# Patient Record
Sex: MALE | Race: BLACK OR AFRICAN AMERICAN | Employment: FULL TIME | URBAN - METROPOLITAN AREA
[De-identification: names, ages, dates, MRNs, and addresses within clinical notes are randomized per-mention and may not be internally consistent; named-entity substitution may affect disease eponyms.]

---

## 2018-11-15 ENCOUNTER — HOSPITAL ENCOUNTER (EMERGENCY)
Age: 63
Discharge: HOME | End: 2018-11-15
Attending: EMERGENCY MEDICINE
Payer: SELF-PAY

## 2018-11-15 VITALS
BODY MASS INDEX: 28.06 KG/M2 | TEMPERATURE: 98.5 F | RESPIRATION RATE: 18 BRPM | SYSTOLIC BLOOD PRESSURE: 145 MMHG | OXYGEN SATURATION: 97 % | DIASTOLIC BLOOD PRESSURE: 77 MMHG | HEIGHT: 65 IN | WEIGHT: 168.43 LBS | HEART RATE: 82 BPM

## 2018-11-15 DIAGNOSIS — Z76.0 MEDICATION REFILL: Primary | ICD-10-CM

## 2018-11-15 PROCEDURE — 99282 EMERGENCY DEPT VISIT SF MDM: CPT

## 2018-11-15 NOTE — ED TRIAGE NOTES
Patient arrives with medications in hand. Patient concerned that his DM and HTN medication say discard in October.  Patient wishes to get a PCP referral.

## 2018-11-15 NOTE — ED PROVIDER NOTES
70 y.o. male with past medical history significant for DM and HTN presents requesting management of his diabetes medication \"because my old doctor wont take my insurance. \"  The pt has no physical complaints at this time. He brought his metformin with him which expires this month. Explained to the patient that his medication is not  and counseled him on taking his diabetes medications as prescribed. Pt referred to [facility]. Pt also given strict return precautions. There are no other acute medical complaints at this time. PCP: Other, MD Jacki Alvarez PA-C Past Medical History:  
Diagnosis Date  Diabetes ([facility] [address]  Hypertension History reviewed. No pertinent surgical history. History reviewed. No pertinent family history. Social History Socioeconomic History  Marital status:  Spouse name: Not on file  Number of children: Not on file  Years of education: Not on file  Highest education level: Not on file Social Needs  Financial resource strain: Not on file  Food insecurity - worry: Not on file  Food insecurity - inability: Not on file  Transportation needs - medical: Not on file  Transportation needs - non-medical: Not on file Occupational History  Not on file Tobacco Use  Smoking status: Never Smoker  Smokeless tobacco: Never Used Substance and Sexual Activity  Alcohol use: No  
  Frequency: Never  Drug use: No  
 Sexual activity: Not on file Other Topics Concern  Not on file Social History Narrative  Not on file ALLERGIES: Patient has no known allergies. Review of Systems Constitutional: Negative for activity change, chills, diaphoresis, fever and unexpected weight change. HENT: Negative for congestion, ear pain, nosebleeds, rhinorrhea, sinus pressure, sore throat, trouble swallowing and voice change. Eyes: Negative for pain, discharge and itching. Respiratory: Negative for cough, chest tightness, shortness of breath and wheezing. Cardiovascular: Negative for chest pain, palpitations and leg swelling. Gastrointestinal: Negative for abdominal distention, abdominal pain, blood in stool, constipation, diarrhea, nausea and vomiting. Endocrine: Negative for cold intolerance, heat intolerance and polyuria. Genitourinary: Negative for decreased urine volume, discharge, dysuria, flank pain, hematuria and testicular pain. Musculoskeletal: Negative for arthralgias, gait problem, myalgias and neck pain. Skin: Negative for color change and pallor. Neurological: Negative for dizziness, syncope, speech difficulty, weakness, light-headedness, numbness and headaches. Psychiatric/Behavioral: Negative for behavioral problems, confusion, hallucinations and suicidal ideas. Vitals:  
 11/15/18 1303 11/15/18 1314 BP:  145/77 Pulse: 86 82 Resp:  18 Temp:  98.5 °F (36.9 °C) SpO2: 96% 97% Weight:  76.4 kg (168 lb 6.9 oz) Height:  5' 5\" (1.651 m) Physical Exam  
Constitutional: He is oriented to person, place, and time. He appears well-developed and well-nourished. No distress. HENT:  
Head: Normocephalic and atraumatic. Head is without raccoon's eyes, without Colby's sign and without laceration. Right Ear: Hearing, tympanic membrane, external ear and ear canal normal. No foreign bodies. Tympanic membrane is not bulging. No hemotympanum. Left Ear: Hearing, tympanic membrane, external ear and ear canal normal. No foreign bodies. Tympanic membrane is not bulging. No hemotympanum. Nose: Nose normal. No mucosal edema or rhinorrhea. Right sinus exhibits no maxillary sinus tenderness and no frontal sinus tenderness. Left sinus exhibits no maxillary sinus tenderness and no frontal sinus tenderness.   
Mouth/Throat: Uvula is midline, oropharynx is clear and moist and mucous membranes are normal. No tonsillar abscesses. Eyes: Conjunctivae and EOM are normal. Pupils are equal, round, and reactive to light. Right eye exhibits no discharge. Left eye exhibits no discharge. Neck: Normal range of motion. Neck supple. Cardiovascular: Normal rate, regular rhythm and normal heart sounds. Exam reveals no gallop and no friction rub. No murmur heard. Regular rate and rhythm. No murmurs, gallops, rubs, or clicks. Pulmonary/Chest: Effort normal and breath sounds normal. No respiratory distress. He has no wheezes. He has no rales. No stridor or wheezes. No accessory muscle usage. No nasal flaring. Breath Sounds equal bilaterally. Abdominal: Soft. Bowel sounds are normal. He exhibits no distension. There is no tenderness. There is no rebound and no guarding. No abdominal Bruits. No pulsatile mass. No abdominal scars. Active bowel sounds. Musculoskeletal: Normal range of motion. He exhibits no edema, tenderness or deformity. Neurological: He is alert and oriented to person, place, and time. Skin: He is not diaphoretic. Nursing note and vitals reviewed. MDM Number of Diagnoses or Management Options Medication refill:  
Diagnosis management comments: Pt referred to [facility]. No signs of PE, MI, PTX, PNA, dissection, or any other acute life threatening illnesses. Pt given strict return precautions. Jacki Card PA-C Procedures

## 2018-11-15 NOTE — DISCHARGE INSTRUCTIONS
We hope that we have addressed all of your medical concerns. The examination and treatment you received in the Emergency Department were for an emergent problem and were not intended as complete care. It is important that you follow up with your healthcare provider(s) for ongoing care. If your symptoms worsen or do not improve as expected, and you are unable to reach your usual health care provider(s), you should return to the Emergency Department. Today's healthcare is undergoing tremendous change, and patient satisfaction surveys are one of the many tools to assess the quality of medical care. You may receive a survey from the Mailbox regarding your experience in the Emergency Department. I hope that your experience has been completely positive, particularly the medical care that I provided. As such, please participate in the survey; anything less than excellent does not meet my expectations or intentions. [facility] and [address] [place] [address] participate in nationally recognized quality of care measures. If your blood pressure is greater than 120/80, as reported below, we urge that you seek medical care to address the potential of high blood pressure, commonly known as hypertension. Hypertension can be hereditary or can be caused by certain medical conditions, pain, stress, or \"white coat syndrome. \"       Please make an appointment with your health care provider(s) for follow up of your Emergency Department visit. VITALS:   Patient Vitals for the past 8 hrs:   Pulse SpO2   11/15/18 1303 86 96 %          Thank you for allowing us to provide you with medical care today. We realize that you have many choices for your emergency care needs. Please choose us in the future for any continued health care needs. Arpit Chow  [address]   Office: [phone]            No results found for this or any previous visit (from the past 24 hour(s)). No results found.

## 2018-12-20 ENCOUNTER — OFFICE VISIT (OUTPATIENT)
Dept: FAMILY MEDICINE CLINIC | Age: 63
End: 2018-12-20

## 2018-12-20 VITALS
TEMPERATURE: 97.5 F | BODY MASS INDEX: 27.82 KG/M2 | RESPIRATION RATE: 16 BRPM | SYSTOLIC BLOOD PRESSURE: 126 MMHG | OXYGEN SATURATION: 99 % | DIASTOLIC BLOOD PRESSURE: 62 MMHG | WEIGHT: 167 LBS | HEIGHT: 65 IN | HEART RATE: 68 BPM

## 2018-12-20 DIAGNOSIS — E78.5 HYPERLIPIDEMIA, UNSPECIFIED HYPERLIPIDEMIA TYPE: ICD-10-CM

## 2018-12-20 DIAGNOSIS — K21.9 GASTROESOPHAGEAL REFLUX DISEASE WITHOUT ESOPHAGITIS: ICD-10-CM

## 2018-12-20 DIAGNOSIS — R07.89 CHEST PAIN, ATYPICAL: ICD-10-CM

## 2018-12-20 DIAGNOSIS — E11.42 TYPE 2 DIABETES MELLITUS WITH DIABETIC POLYNEUROPATHY, WITHOUT LONG-TERM CURRENT USE OF INSULIN (HCC): Primary | ICD-10-CM

## 2018-12-20 DIAGNOSIS — I10 ESSENTIAL HYPERTENSION: ICD-10-CM

## 2018-12-20 RX ORDER — METFORMIN HYDROCHLORIDE 1000 MG/1
1000 TABLET ORAL 2 TIMES DAILY WITH MEALS
Qty: 60 TAB | Refills: 1 | Status: SHIPPED | OUTPATIENT
Start: 2018-12-20

## 2018-12-20 RX ORDER — METFORMIN HYDROCHLORIDE 1000 MG/1
1000 TABLET ORAL 2 TIMES DAILY WITH MEALS
COMMUNITY
End: 2018-12-20 | Stop reason: SDUPTHER

## 2018-12-20 RX ORDER — ASPIRIN 81 MG/1
81 TABLET ORAL DAILY
Qty: 30 TAB | Refills: 1 | Status: SHIPPED | OUTPATIENT
Start: 2018-12-20

## 2018-12-20 RX ORDER — ATORVASTATIN CALCIUM 80 MG/1
80 TABLET, FILM COATED ORAL DAILY
COMMUNITY
End: 2018-12-20 | Stop reason: SDUPTHER

## 2018-12-20 RX ORDER — ATORVASTATIN CALCIUM 80 MG/1
80 TABLET, FILM COATED ORAL DAILY
Qty: 30 TAB | Refills: 1 | Status: SHIPPED | OUTPATIENT
Start: 2018-12-20

## 2018-12-20 RX ORDER — LOSARTAN POTASSIUM AND HYDROCHLOROTHIAZIDE 25; 100 MG/1; MG/1
1 TABLET ORAL DAILY
Qty: 30 TAB | Refills: 1 | Status: SHIPPED | OUTPATIENT
Start: 2018-12-20

## 2018-12-20 RX ORDER — IBUPROFEN 800 MG/1
TABLET ORAL 2 TIMES DAILY
COMMUNITY
End: 2018-12-20 | Stop reason: ALTCHOICE

## 2018-12-20 RX ORDER — LOSARTAN POTASSIUM AND HYDROCHLOROTHIAZIDE 25; 100 MG/1; MG/1
1 TABLET ORAL DAILY
COMMUNITY
End: 2018-12-20 | Stop reason: SDUPTHER

## 2018-12-20 RX ORDER — ASPIRIN 81 MG/1
TABLET ORAL DAILY
COMMUNITY
End: 2018-12-20 | Stop reason: SDUPTHER

## 2018-12-20 NOTE — PATIENT INSTRUCTIONS
Please go to [facility] and  the brand Relion blood sugar meter and supplies. Check BG daily in the morning before breakfast/coffee. Follow-up in 4 weeks. Take Zantac 1 tablets daily as needed for reflux. Ask pharmacist about this. Learning About Diabetes Food Guidelines  Your Care Instructions    Meal planning is important to manage diabetes. It helps keep your blood sugar at a target level (which you set with your doctor). You don't have to eat special foods. You can eat what your family eats, including sweets once in a while. But you do have to pay attention to how often you eat and how much you eat of certain foods. You may want to work with a dietitian or a certified diabetes educator (CDE) to help you plan meals and snacks. A dietitian or CDE can also help you lose weight if that is one of your goals. What should you know about eating carbs? Managing the amount of carbohydrate (carbs) you eat is an important part of healthy meals when you have diabetes. Carbohydrate is found in many foods. · Learn which foods have carbs. And learn the amounts of carbs in different foods. ? Bread, cereal, pasta, and rice have about 15 grams of carbs in a serving. A serving is 1 slice of bread (1 ounce), ½ cup of cooked cereal, or 1/3 cup of cooked pasta or rice. ? Fruits have 15 grams of carbs in a serving. A serving is 1 small fresh fruit, such as an apple or orange; ½ of a banana; ½ cup of cooked or canned fruit; ½ cup of fruit juice; 1 cup of melon or raspberries; or 2 tablespoons of dried fruit. ? Milk and no-sugar-added yogurt have 15 grams of carbs in a serving. A serving is 1 cup of milk or 2/3 cup of no-sugar-added yogurt. ? Starchy vegetables have 15 grams of carbs in a serving. A serving is ½ cup of mashed potatoes or sweet potato; 1 cup winter squash; ½ of a small baked potato; ½ cup of cooked beans; or ½ cup cooked corn or green peas.   · Learn how much carbs to eat each day and at each meal. A dietitian or CDE can teach you how to keep track of the amount of carbs you eat. This is called carbohydrate counting. · If you are not sure how to count carbohydrate grams, use the Plate Method to plan meals. It is a good, quick way to make sure that you have a balanced meal. It also helps you spread carbs throughout the day. ? Divide your plate by types of foods. Put non-starchy vegetables on half the plate, meat or other protein food on one-quarter of the plate, and a grain or starchy vegetable in the final quarter of the plate. To this you can add a small piece of fruit and 1 cup of milk or yogurt, depending on how many carbs you are supposed to eat at a meal.  · Try to eat about the same amount of carbs at each meal. Do not \"save up\" your daily allowance of carbs to eat at one meal.  · Proteins have very little or no carbs per serving. Examples of proteins are beef, chicken, turkey, fish, eggs, tofu, cheese, cottage cheese, and peanut butter. A serving size of meat is 3 ounces, which is about the size of a deck of cards. Examples of meat substitute serving sizes (equal to 1 ounce of meat) are 1/4 cup of cottage cheese, 1 egg, 1 tablespoon of peanut butter, and ½ cup of tofu. How can you eat out and still eat healthy? · Learn to estimate the serving sizes of foods that have carbohydrate. If you measure food at home, it will be easier to estimate the amount in a serving of restaurant food. · If the meal you order has too much carbohydrate (such as potatoes, corn, or baked beans), ask to have a low-carbohydrate food instead. Ask for a salad or green vegetables. · If you use insulin, check your blood sugar before and after eating out to help you plan how much to eat in the future. · If you eat more carbohydrate at a meal than you had planned, take a walk or do other exercise. This will help lower your blood sugar. What else should you know?   · Limit saturated fat, such as the fat from meat and dairy products. This is a healthy choice because people who have diabetes are at higher risk of heart disease. So choose lean cuts of meat and nonfat or low-fat dairy products. Use olive or canola oil instead of butter or shortening when cooking. · Don't skip meals. Your blood sugar may drop too low if you skip meals and take insulin or certain medicines for diabetes. · Check with your doctor before you drink alcohol. Alcohol can cause your blood sugar to drop too low. Alcohol can also cause a bad reaction if you take certain diabetes medicines. Follow-up care is a key part of your treatment and safety. Be sure to make and go to all appointments, and call your doctor if you are having problems. It's also a good idea to know your test results and keep a list of the medicines you take. Where can you learn more? Go to http://yu-manpreet.info/. Enter P904 in the search box to learn more about \"Learning About Diabetes Food Guidelines. \"  Current as of: December 7, 2017  Content Version: 11.8  © 6717-7473 Healthwise, [employer]. Care instructions adapted under license by [employer] (which disclaims liability or warranty for this information). If you have questions about a medical condition or this instruction, always ask your healthcare professional. [address] any warranty or liability for your use of this information.

## 2018-12-20 NOTE — PROGRESS NOTES
[facility] Note  HPI:   Biju Quezada is a 70 y.o. male who presents to establish care. Previous provider Dr. Scottie Tanner. Chief Complaint   Patient presents with    New Patient     pt's medications were given by Dr. Scottie Tanner in [facility] Medication Refill     pt states he went to [address] [place] [address] for medication refills. was told to get PCP. Pt states he was in [place] and his meds . Biju Quezada is a 70 y.o. male who presents for evaluation of Type 2 diabetes mellitus. Diagnosed about 5 years ago. He cannot recall last A1c. Current symptoms/problems include paresthesias of the feet: mild and have been unchanged. Symptoms have been present for 1 year. Known diabetic complications: none  Cardiovascular risk factors: smoking/ tobacco exposure, dyslipidemia, diabetes mellitus, sedentary life style, male gender, hypertension  Current diabetic medications include oral agent (monotherapy): Glucophage 1,000 BID     Eye exam current (within one year): Yes  Weight trend: some weight loss a few months ago, but he has gained that back. Prior visit with dietician: no  Current diet: \"unhealthy\" diet in general; likes carbs, more pleasurable to him. Does not eat vegetables all the time. Likes grilled meats. 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 Any episodes of hypoglycemia? no    Is He on ACE inhibitor or angiotensin II receptor blocker? Yes   Statin: Yes     Cardiovascular Review:  The patient has diabetes, hypertension and hyperlipidemia. Diet and Lifestyle: smoker 1 pack per day, caffeine intake 2-3 cups coffee,    Home BP Monitoring: is not measured at home.   Pertinent ROS: taking medications as instructed, no medication side effects noted, no TIA's, no chest pain on exertion, no dyspnea on exertion, no swelling of ankles, no palpitations. Some burning uncomfortable feeling in left or right chest late at night with associated belching. Does not happpen every day. Aggravated with spicy or high fat foods. Alleviated with repositioning and drinking water. Prilosec helps. Current Outpatient Medications   Medication Sig Dispense Refill    losartan-hydroCHLOROthiazide (HYZAAR) 100-25 mg per tablet Take 1 Tab by mouth daily. 30 Tab 1    metFORMIN (GLUCOPHAGE) 1,000 mg tablet Take 1 Tab by mouth two (2) times daily (with meals). 60 Tab 1    atorvastatin (LIPITOR) 80 mg tablet Take 1 Tab by mouth daily. 30 Tab 1    aspirin delayed-release 81 mg tablet Take 1 Tab by mouth daily. 30 Tab 1      No Known Allergies   There is no problem list on file for this patient. Past Medical History:   Diagnosis Date    Acid reflux     Diabetes ([facility] [address]     High cholesterol     Hypertension       History reviewed. No pertinent surgical history. No LMP for male patient. No family history on file. Social History     Socioeconomic History    Marital status: LEGALLY      Spouse name: Not on file    Number of children: Not on file    Years of education: Not on file    Highest education level: Not on file   Social Needs    Financial resource strain: Not on file    Food insecurity - worry: Not on file    Food insecurity - inability: Not on file    Transportation needs - medical: Not on file   Mysterio needs - non-medical: Not on file   Occupational History    Not on file   Tobacco Use    Smoking status: Current Every Day Smoker    Smokeless tobacco: Never Used   Substance and Sexual Activity    Alcohol use: No     Frequency: Never    Drug use: No    Sexual activity: Not on file   Other Topics Concern    Not on file   Social History Narrative    Not on file        ROS:   Review of Systems   Constitutional: Negative for chills, diaphoresis, fever, malaise/fatigue and weight loss.    HENT: Negative for congestion, ear pain, hearing loss, sinus pain, sore throat and tinnitus. Eyes: Negative for blurred vision and double vision. Respiratory: Negative for shortness of breath. Cardiovascular: Negative for chest pain, palpitations and leg swelling. Gastrointestinal: Positive for heartburn. Negative for abdominal pain, blood in stool, constipation, diarrhea, melena, nausea and vomiting. Genitourinary: Negative for dysuria, frequency, hematuria and urgency. Musculoskeletal: Negative for joint pain and myalgias. Skin: Negative for itching and rash. Neurological: Positive for tingling. Negative for dizziness, sensory change, speech change, focal weakness, weakness and headaches. Endo/Heme/Allergies: Negative for polydipsia. Psychiatric/Behavioral: Negative. Physical Exam:     Visit Vitals  /62 (BP 1 Location: Left arm, BP Patient Position: Sitting)   Pulse 68   Temp 97.5 °F (36.4 °C) (Oral)   Resp 16   Ht 5' 5\" (1.651 m)   Wt 167 lb (75.8 kg)   SpO2 99%   BMI 27.79 kg/m²        Vitals and Nurse Documentation reviewed. Physical Exam   Constitutional: He is oriented to person, place, and time and well-developed, well-nourished, and in no distress. HENT:   Head: Normocephalic and atraumatic. Neck: Carotid bruit is not present. Cardiovascular: Normal rate, regular rhythm, normal heart sounds and intact distal pulses. Exam reveals no gallop and no friction rub. No murmur heard. Pulmonary/Chest: Effort normal and breath sounds normal. No respiratory distress. He has no wheezes. He has no rales. He exhibits no tenderness. Musculoskeletal: He exhibits no edema. Neurological: He is alert and oriented to person, place, and time. Gait normal.   Skin: Skin is warm and dry. Psychiatric: Mood, memory, affect and judgment normal.   Nursing note and vitals reviewed.       EKG: NSR    Assessment/ Plan:   [employer] were discussed including hours of operation, on-call providers, and [employer]. Diagnoses and all orders for this visit:    1. Type 2 diabetes mellitus with diabetic polyneuropathy, without long-term current use of insulin ([facility] [address]): New patient, long standing history of DM2, control is unclear. On metformin 1,000 daily.   - A1c, BMP, microalbumin today. - Advised to check BG daily Q AM  - Dietary modifications reviewed, he declines   - Advised to call [facility] for insurance coverage options. - Close follow-up in 4 weeks. Consider referral to pharmacist given financial issues with medication availability.   -     METABOLIC PANEL, COMPREHENSIVE  -     HEMOGLOBIN A1C WITH EAG  -     MICROALBUMIN, UR, RAND  -     aspirin delayed-release 81 mg tablet; Take 1 Tab by mouth daily. 2. Chest pain, atypical: EKG NSR. Symptoms appear most consistent with GERD. Dietary modifications reviewed. Start Zantac 1-2 times daily.    -     AMB POC EKG ROUTINE W/ 12 LEADS, INTER & REP    3. Hyperlipidemia, unspecified hyperlipidemia type: Unclear control. On statin. Non-fasting lipids today. -     LIPID PANEL    4. Essential hypertension: Stable, BP at goal, <140/80. Continue current regimen.   -     METABOLIC PANEL, COMPREHENSIVE  -     MICROALBUMIN, UR, RAND  -     aspirin delayed-release 81 mg tablet; Take 1 Tab by mouth daily. 5. Gastroesophageal reflux disease without esophagitis: Per #2. Other orders  -     losartan-hydroCHLOROthiazide (HYZAAR) 100-25 mg per tablet; Take 1 Tab by mouth daily. -     metFORMIN (GLUCOPHAGE) 1,000 mg tablet; Take 1 Tab by mouth two (2) times daily (with meals). -     atorvastatin (LIPITOR) 80 mg tablet; Take 1 Tab by mouth daily. -     CVD REPORT      Follow-up Disposition:  Return in about 4 weeks (around 1/17/2019).      Discussed expected course/resolution/complications of diagnosis in detail with patient.    Medication risks/benefits/costs/interactions/alternatives discussed with patient.    Pt was given an after visit summary which includes diagnoses, current medications & vitals.  Pt expressed understanding with the diagnosis and plan

## 2018-12-20 NOTE — PROGRESS NOTES
Chief Complaint   Patient presents with    New Patient     pt's medications were given by Dr. Trista Mcpherson in [facility] Medication Refill     pt states he went to [facility] for medication refills. was told to get PCP. Pt states he was in [place] and his meds . \"REVIEWED RECORD IN PREPARATION FOR VISIT AND HAVE OBTAINED THE NECESSARY DOCUMENTATION\"  1. Have you been to the ER, urgent care clinic since your last visit? Hospitalized since your last visit? Yes Reason for visit: see above    2. Have you seen or consulted any other health care providers outside of the [address] [place] [address] since your last visit? Include any pap smears or colon screening.  Yes Reason for visit: see above

## 2018-12-21 LAB
ALBUMIN SERPL-MCNC: 4.4 G/DL (ref 3.6–4.8)
ALBUMIN/GLOB SERPL: 1.6 {RATIO} (ref 1.2–2.2)
ALP SERPL-CCNC: 80 IU/L (ref 39–117)
ALT SERPL-CCNC: 25 IU/L (ref 0–44)
AST SERPL-CCNC: 22 IU/L (ref 0–40)
BILIRUB SERPL-MCNC: 0.3 MG/DL (ref 0–1.2)
BUN SERPL-MCNC: 9 MG/DL (ref 8–27)
BUN/CREAT SERPL: 10 (ref 10–24)
CALCIUM SERPL-MCNC: 9.4 MG/DL (ref 8.6–10.2)
CHLORIDE SERPL-SCNC: 105 MMOL/L (ref 96–106)
CHOLEST SERPL-MCNC: 121 MG/DL (ref 100–199)
CO2 SERPL-SCNC: 23 MMOL/L (ref 20–29)
CREAT SERPL-MCNC: 0.91 MG/DL (ref 0.76–1.27)
EST. AVERAGE GLUCOSE BLD GHB EST-MCNC: 128 MG/DL
GLOBULIN SER CALC-MCNC: 2.8 G/DL (ref 1.5–4.5)
GLUCOSE SERPL-MCNC: 116 MG/DL (ref 65–99)
HBA1C MFR BLD: 6.1 % (ref 4.8–5.6)
HDLC SERPL-MCNC: 44 MG/DL
INTERPRETATION, 910389: NORMAL
LDLC SERPL CALC-MCNC: 61 MG/DL (ref 0–99)
MICROALBUMIN UR-MCNC: 72.6 UG/ML
POTASSIUM SERPL-SCNC: 4.1 MMOL/L (ref 3.5–5.2)
PROT SERPL-MCNC: 7.2 G/DL (ref 6–8.5)
SODIUM SERPL-SCNC: 142 MMOL/L (ref 134–144)
TRIGL SERPL-MCNC: 78 MG/DL (ref 0–149)
VLDLC SERPL CALC-MCNC: 16 MG/DL (ref 5–40)

## 2018-12-21 NOTE — PROGRESS NOTES
A1c in pre-diabetes range. Continue with metformin for now. Check BG QAM fasting, bring log/meter to 4 week follow-up. Urine microalbumin is elevated, treatment is good control of DM and adequate hydration. We will repeat at next visit. Cholesterol looks great, continue statin.

## 2019-09-20 ENCOUNTER — HOSPITAL ENCOUNTER (OUTPATIENT)
Dept: LAB | Age: 64
Discharge: HOME | End: 2019-09-20

## 2019-09-20 LAB
ALBUMIN SERPL-MCNC: 4 G/DL (ref 3.5–5)
ALBUMIN/GLOB SERPL: 1.1 {RATIO} (ref 1.1–2.2)
ALP SERPL-CCNC: 110 U/L (ref 45–117)
ALT SERPL-CCNC: 20 U/L (ref 12–78)
ANION GAP SERPL CALC-SCNC: 4 MMOL/L (ref 5–15)
AST SERPL-CCNC: 11 U/L (ref 15–37)
BILIRUB SERPL-MCNC: 0.7 MG/DL (ref 0.2–1)
BUN SERPL-MCNC: 10 MG/DL (ref 6–20)
BUN/CREAT SERPL: 11 (ref 12–20)
CALCIUM SERPL-MCNC: 9.1 MG/DL (ref 8.5–10.1)
CHLORIDE SERPL-SCNC: 106 MMOL/L (ref 97–108)
CHOLEST SERPL-MCNC: 152 MG/DL
CO2 SERPL-SCNC: 29 MMOL/L (ref 21–32)
CREAT SERPL-MCNC: 0.92 MG/DL (ref 0.7–1.3)
CREAT UR-MCNC: 144 MG/DL
EST. AVERAGE GLUCOSE BLD GHB EST-MCNC: 140 MG/DL
GLOBULIN SER CALC-MCNC: 3.8 G/DL (ref 2–4)
GLUCOSE SERPL-MCNC: 123 MG/DL (ref 65–100)
HBA1C MFR BLD: 6.5 % (ref 4.2–6.3)
HDLC SERPL-MCNC: 41 MG/DL
HDLC SERPL: 3.7 {RATIO} (ref 0–5)
LDLC SERPL CALC-MCNC: 92.6 MG/DL (ref 0–100)
LIPID PROFILE,FLP: NORMAL
MICROALBUMIN UR-MCNC: 5.34 MG/DL
MICROALBUMIN/CREAT UR-RTO: 37 MG/G (ref 0–30)
POTASSIUM SERPL-SCNC: 4.3 MMOL/L (ref 3.5–5.1)
PROT SERPL-MCNC: 7.8 G/DL (ref 6.4–8.2)
SODIUM SERPL-SCNC: 139 MMOL/L (ref 136–145)
TRIGL SERPL-MCNC: 92 MG/DL (ref ?–150)
VLDLC SERPL CALC-MCNC: 18.4 MG/DL

## 2019-09-20 PROCEDURE — 83036 HEMOGLOBIN GLYCOSYLATED A1C: CPT

## 2019-09-20 PROCEDURE — 80061 LIPID PANEL: CPT

## 2019-09-20 PROCEDURE — 80053 COMPREHEN METABOLIC PANEL: CPT

## 2019-09-20 PROCEDURE — 82570 ASSAY OF URINE CREATININE: CPT

## 2022-03-29 ENCOUNTER — OFFICE VISIT (OUTPATIENT)
Dept: ENT CLINIC | Age: 67
End: 2022-03-29

## 2022-03-29 ENCOUNTER — TELEPHONE (OUTPATIENT)
Dept: ENT CLINIC | Age: 67
End: 2022-03-29

## 2022-03-29 ENCOUNTER — OFFICE VISIT (OUTPATIENT)
Dept: ENT CLINIC | Age: 67
End: 2022-03-29
Payer: SUBSIDIZED

## 2022-03-29 VITALS
RESPIRATION RATE: 16 BRPM | OXYGEN SATURATION: 97 % | WEIGHT: 167 LBS | SYSTOLIC BLOOD PRESSURE: 160 MMHG | DIASTOLIC BLOOD PRESSURE: 96 MMHG | HEART RATE: 61 BPM | BODY MASS INDEX: 27.82 KG/M2 | HEIGHT: 65 IN | TEMPERATURE: 97.5 F

## 2022-03-29 DIAGNOSIS — H91.93 DECREASED HEARING OF BOTH EARS: Primary | ICD-10-CM

## 2022-03-29 DIAGNOSIS — H90.3 SENSORINEURAL HEARING LOSS (SNHL) OF BOTH EARS: Primary | ICD-10-CM

## 2022-03-29 DIAGNOSIS — H90.3 SENSORINEURAL HEARING LOSS (SNHL), BILATERAL: ICD-10-CM

## 2022-03-29 PROCEDURE — 99203 OFFICE O/P NEW LOW 30 MIN: CPT | Performed by: OTOLARYNGOLOGY

## 2022-03-29 PROCEDURE — 92567 TYMPANOMETRY: CPT | Performed by: AUDIOLOGIST

## 2022-03-29 PROCEDURE — 92557 COMPREHENSIVE HEARING TEST: CPT | Performed by: AUDIOLOGIST

## 2022-03-29 NOTE — TELEPHONE ENCOUNTER
Provided pt with information for [facility] as instructed since they are closer to the pt. Pt would like a referral to [facility]. Please Advise.

## 2022-03-29 NOTE — PROGRESS NOTES
Otolaryngology-Head and Neck Surgery  New Patient Visit     Patient: Logan Doe  YOB: 1955  MRN: [id number]  Date of Service: 3/29/2022    Chief Complaint:  Hearing loss     History of Present Illness: Logan Doe is a 74y.o. year old male who presents today for discussion of hearing loss    Chronic bilateral hearing loss, ongoing going for years, unsure if getting worse  Needing to ask people to repeat themselves    Previously tinnitus, less so now    + Noise exposure - worked in [address] [place] [address] in [facility]     No fam hx hearing loss  No prior ear surgeries     Past Medical History:  Past Medical History:   Diagnosis Date    Acid reflux     Diabetes ([address]     High cholesterol     Hypertension        Past Surgical History:   History reviewed. No pertinent surgical history. Medications:   Current Outpatient Medications   Medication Instructions    aspirin delayed-release 81 mg, Oral, DAILY    atorvastatin (LIPITOR) 80 mg, Oral, DAILY    losartan-hydroCHLOROthiazide (HYZAAR) 100-25 mg per tablet 1 Tablet, Oral, DAILY    metFORMIN (GLUCOPHAGE) 1,000 mg, Oral, 2 TIMES DAILY WITH MEALS       Allergies:   No Known Allergies    Social History:   Social History     Tobacco Use    Smoking status: Current Every Day Smoker     Packs/day: 1.00     Years: 10.00     Pack years: 10.00    Smokeless tobacco: Current User   Substance Use Topics    Alcohol use: No    Drug use: No        Family History:  History reviewed. No pertinent family history. Review of Systems:    Consitutional: denies fever, excessive weight gain or loss. Eyes: denies diplopia, eye pain. Integumentary: denies new concerning skin lesions. Ears, Nose, Mouth, Throat: denies except as per HPI.   Endocrine: denies hot or cold intolerance, increased thirst.  Respiratory: denies cough, hemoptysis, wheezing  Gastrointestinal: denies trouble swallowing, nausea, emesis, regurgitation  Musculoskeletal: denies muscle weakness or wasting  Cardiovascular: denies chest pain, shortness of breath  Neurologic: denies seizures, numbness or tingling, syncope  Hematologic: denies easy bleeding or bruising    Physical Examination:   Vitals:    03/29/22 0902   BP: (!) 160/96   BP 1 Location: Left upper arm   BP Patient Position: Sitting   BP Cuff Size: Large adult   Pulse: 61   Temp: 97.5 °F (36.4 °C)   TempSrc: Temporal   Resp: 16   Height: 5' 5\" (1.651 m)   Weight: 167 lb (75.8 kg)   SpO2: 97%        General: Comfortable, pleasant, appears stated age  Voice: Strong, speaking in full sentences, no stridor    Face: No masses or lesions, facial strength symmetric   Ears: External ears unremarkable. Bilateral ear canal clear. Tympanic membrane clear and intact, with visible landmarks. Clear middle ear space  Nose: External nose unremarkable. Dorsum midline. Anterior rhinoscopy demonstrates no lesions. Septum midline. Turbinates without hypertrophy. Oral Cavity / Oropharynx: No trismus. Mucosa pink and moist. No lesions. Tongue is midline and mobile. Palate elevates symmetrically. Uvula midline. Tonsils unremarkable. Base of tongue soft. Floor of mouth soft. Neck: Supple. No adenopathy. Thyroid unremarkable. Palpable laryngeal landmarks. Full neck range of motion   Neurologic: CN II - XI intact. Normal gait    Audiogram today shows bilateral sloping SNHL, fair WRS, type A tymp          Assessment and Plan:   1. Bilateral SNHL  - Reviewed audiogram findings with patient  - Discussed hearing aids -- he is frustrated and does not want to have to come back and forth  - Discussed unfortunately hearing aids are a process which will require additional visits for fitting, adjustments etc  - Information provided for hearing resources closer to patient (he lives in [place]  - Can also plan for hearing aid evaluation with us should he desire         The patient was instructed to return to clinic if no improvement or progression of symptoms.  Signs to watch out for reviewed.       Mardi Mcardle, MD [address] [facility]  [address] [facility] [address]  [facility]  Office Phone: [phone]

## 2022-03-29 NOTE — PROGRESS NOTES
Sigifredo Andrea, a 74y.o. year old male, was seen in ENT clinic today for a hearing evaluation on referral from Dr. Clarissa Mckeon. Patient complains of hearing loss in both ears. No recent audiogram. Patient denies tinnitus, ear pain, or dizziness/balance issues. He does report a history of noise exposure without the use of hearing protection. Otoscopy: normal external ear canals and visible tympanic membranes, bilaterally. Tympanometry: RE Type A, normal  LE Type A, normal    SRT: RE Speech Reception Threshold (SRT) was obtained at 45 dBHL LE Speech Reception Threshold (SRT) was obtained at 35 dBHL    WRS: RE Fair in quiet when words were presented at 85 dBHL. WRS: LE Fair in quiet when words were presented at 85 dBHL. *patient requested 50 dB SL presentation level; MCL    *Binaural WRS 85/85 R/L: score improved 20%    Pure tone audiometry:  RE: Mild sloping to severe sensorineural hearing loss  LE: Mild sloping to severe sensorineural hearing loss    Sensorineural hearing loss, bilaterally. Of note, patient had to be frequently reinstructed as thresholds were initially elevated and he was reluctant to guess during speech testing. Impressions:  hearing loss requiring medical/otologic and audiologic follow-up  Discussed results of today's testing with patient. Patient is a good candidate for amplification. Discussed benefits of hearing aids and recommended hearing aid evaluation to discuss options. Patient indicated understanding but stated that he would prefer to pursue hearing aids at an office closer to his home. Recommended [employer] and patient will fill out release of information form. Provided copy of audiogram to patient today. Plan:  Follow-up with ENT.   Repeat audiogram upon request.  Hearing aid evaluation upon request.    Jim Berrios   Doctor of Audiology

## 2022-03-29 NOTE — PROGRESS NOTES
Visit Vitals  BP (!) 160/96 (BP 1 Location: Left upper arm, BP Patient Position: Sitting, BP Cuff Size: Large adult)   Pulse 61   Temp 97.5 °F (36.4 °C) (Temporal)   Resp 16   Ht 5' 5\" (1.651 m)   Wt 167 lb (75.8 kg)   SpO2 97%   BMI 27.79 kg/m²     Chief Complaint   Patient presents with    New Patient     HT

## 2022-03-29 NOTE — LETTER
3/29/2022    Patient: Anay Brooks   YOB: 1955   Date of Visit: 3/29/2022     S[place] Trinidad, [address] [facility] [address]  Via Fax: [phone]    Dear Suma Trinidad MD,      Thank you for referring Mr. Anay Brooks to [facility] [address] for evaluation. My notes for this consultation are attached. If you have questions, please do not hesitate to call me. I look forward to following your patient along with you.       Sincerely,    Jennifer Adame MD

## 2022-04-26 ENCOUNTER — OFFICE VISIT (OUTPATIENT)
Dept: ENT CLINIC | Age: 67
End: 2022-04-26
Payer: SUBSIDIZED

## 2022-04-26 DIAGNOSIS — H90.3 SENSORINEURAL HEARING LOSS (SNHL) OF BOTH EARS: Primary | ICD-10-CM

## 2022-04-26 PROCEDURE — V5010 ASSESSMENT FOR HEARING AID: HCPCS | Performed by: AUDIOLOGIST

## 2022-04-26 NOTE — PROGRESS NOTES
Pt. Name: Rinaa Jarquin   : 1955  MRN: [id number]    Appointment type: Hearing Aid Evaluation  Patient is a very pleasant 69y.o. year old male referred by Dr. Coco Ramírez for a hearing aid evaluation. Patient's last hearing test was 3- which shows a bilateral mild sloping to severe sensorineural hearing loss. Patient reports difficulty understanding speech, especially in the presence of background noise. Reviewed audiogram with patient and discussed hearing aid candidacy. Went over hearing aid style, technology and cost. Also discussed option to apply for hearing aids through the [employer]; patient prefers this option. Completed patient's portion of [employer] application today during appointment. Informed patient of $50.00 fitting fee to be paid at the time of hearing aid fitting, if application is accepted. Patient indicated understanding. Will have ENT sign off on application and mail it to [employer]. Plan: Patient to RTC for HAF if application is approved.      Jim Issa  Doctor of Audiology

## 2022-06-07 ENCOUNTER — TELEPHONE (OUTPATIENT)
Dept: ENT CLINIC | Age: 67
End: 2022-06-07

## 2022-06-07 NOTE — TELEPHONE ENCOUNTER
Spoke with pt regarding scheduling him a hearing aid fitting appt and to inform him that his [employer] application was approved. Pt stated that he would like to call us back to schedule the appt. I also reminded pt that there is a $50 fee due at the time of the appt. Pt understood this and stated he would call back to schedule an appt when it is more convenient for him.

## 2022-10-03 ENCOUNTER — TELEPHONE (OUTPATIENT)
Dept: ENT CLINIC | Age: 67
End: 2022-10-03

## 2022-10-03 NOTE — TELEPHONE ENCOUNTER
Pt called about picking up a hearing aid. Can you please call him back? He was unclear if this was a fitting.  Thanks, [employer]